# Patient Record
Sex: MALE | URBAN - NONMETROPOLITAN AREA
[De-identification: names, ages, dates, MRNs, and addresses within clinical notes are randomized per-mention and may not be internally consistent; named-entity substitution may affect disease eponyms.]

---

## 2024-07-30 ENCOUNTER — LAB OUTSIDE AN ENCOUNTER (OUTPATIENT)
Dept: URBAN - NONMETROPOLITAN AREA CLINIC 13 | Facility: CLINIC | Age: 69
End: 2024-07-30

## 2024-07-30 ENCOUNTER — DASHBOARD ENCOUNTERS (OUTPATIENT)
Age: 69
End: 2024-07-30

## 2024-07-30 ENCOUNTER — OFFICE VISIT (OUTPATIENT)
Dept: URBAN - NONMETROPOLITAN AREA CLINIC 13 | Facility: CLINIC | Age: 69
End: 2024-07-30
Payer: COMMERCIAL

## 2024-07-30 VITALS
HEIGHT: 68 IN | SYSTOLIC BLOOD PRESSURE: 157 MMHG | WEIGHT: 267 LBS | HEART RATE: 87 BPM | BODY MASS INDEX: 40.47 KG/M2 | DIASTOLIC BLOOD PRESSURE: 78 MMHG

## 2024-07-30 DIAGNOSIS — K21.9 GERD WITHOUT ESOPHAGITIS: ICD-10-CM

## 2024-07-30 DIAGNOSIS — Z12.11 COLON CANCER SCREENING: ICD-10-CM

## 2024-07-30 PROBLEM — 266435005: Status: ACTIVE | Noted: 2024-07-30

## 2024-07-30 PROCEDURE — 99203 OFFICE O/P NEW LOW 30 MIN: CPT | Performed by: NURSE PRACTITIONER

## 2024-07-30 RX ORDER — PREDNISOLONE ACETATE 10 MG/ML
ONE DROP 4X DAY SURGICAL EYE (START 3 DAYS PRIOR TO SURGERY) SUSPENSION/ DROPS OPHTHALMIC
Qty: 5 MILLILITER | Refills: 1 | Status: ACTIVE | COMMUNITY

## 2024-07-30 RX ORDER — PANTOPRAZOLE SODIUM 40 MG/1
TABLET, DELAYED RELEASE ORAL
Qty: 90 TABLET | Status: ACTIVE | COMMUNITY

## 2024-07-30 RX ORDER — METOPROLOL SUCCINATE 50 MG/1
TAKE 1.5 TABLETS BY MOUTH 1 TIME EACH DAY TABLET, EXTENDED RELEASE ORAL
Qty: 135 EACH | Refills: 0 | Status: ACTIVE | COMMUNITY

## 2024-07-30 RX ORDER — PREDNISOLONE ACETATE 10 MG/ML
ONE DROP 4X DAY SURGICAL EYE (START 3 DAYS PRIOR TO SURGERY) SUSPENSION/ DROPS OPHTHALMIC
Qty: 5 MILLILITER | Refills: 1 | Status: ON HOLD | COMMUNITY

## 2024-07-30 RX ORDER — FUROSEMIDE 40 MG/1
TAKE 1 TABLET BY MOUTH EVERY DAY AS NEEDED FOR SWELLING TABLET ORAL
Qty: 30 EACH | Refills: 5 | Status: ACTIVE | COMMUNITY

## 2024-07-30 RX ORDER — POTASSIUM CHLORIDE 750 MG/1
TABLET, FILM COATED, EXTENDED RELEASE ORAL
Qty: 30 TABLET | Status: ACTIVE | COMMUNITY

## 2024-07-30 NOTE — HPI-TODAY'S VISIT:
7/0/2024 Mr. Valladares presents for evaluation of colorectal cancer screening. They deny any significant GI complaints. There is no report of rectal bleeding. He will see bright red on the tissue at times. They do not report any issues with anemia. They have never had a colonoscopy in the past. He has reflux well controlled on protonix. Otherwise the patient is healthy, risks and benefits were discussed and they agree to pursue colon cancer screening with colonoscopy. CS

## 2024-09-10 ENCOUNTER — CLAIMS CREATED FROM THE CLAIM WINDOW (OUTPATIENT)
Dept: URBAN - NONMETROPOLITAN AREA SURGERY CENTER 1 | Facility: SURGERY CENTER | Age: 69
End: 2024-09-10
Payer: COMMERCIAL

## 2024-09-10 ENCOUNTER — CLAIMS CREATED FROM THE CLAIM WINDOW (OUTPATIENT)
Dept: URBAN - METROPOLITAN AREA CLINIC 4 | Facility: CLINIC | Age: 69
End: 2024-09-10
Payer: COMMERCIAL

## 2024-09-10 DIAGNOSIS — D12.4 ADENOMA OF DESCENDING COLON: ICD-10-CM

## 2024-09-10 DIAGNOSIS — Z12.11 COLON CANCER SCREENING: ICD-10-CM

## 2024-09-10 DIAGNOSIS — D12.4 BENIGN NEOPLASM OF DESCENDING COLON: ICD-10-CM

## 2024-09-10 PROCEDURE — 45385 COLONOSCOPY W/LESION REMOVAL: CPT | Performed by: INTERNAL MEDICINE

## 2024-09-10 PROCEDURE — 88305 TISSUE EXAM BY PATHOLOGIST: CPT | Performed by: PATHOLOGY

## 2024-09-10 PROCEDURE — 00812 ANES LWR INTST SCR COLSC: CPT | Performed by: NURSE ANESTHETIST, CERTIFIED REGISTERED
